# Patient Record
Sex: MALE | Race: WHITE | NOT HISPANIC OR LATINO | URBAN - METROPOLITAN AREA
[De-identification: names, ages, dates, MRNs, and addresses within clinical notes are randomized per-mention and may not be internally consistent; named-entity substitution may affect disease eponyms.]

---

## 2022-10-19 ENCOUNTER — OFFICE (OUTPATIENT)
Dept: URBAN - METROPOLITAN AREA CLINIC 79 | Facility: CLINIC | Age: 27
End: 2022-10-19
Payer: OTHER GOVERNMENT

## 2022-10-19 VITALS
WEIGHT: 181.4 LBS | TEMPERATURE: 96 F | HEIGHT: 72 IN | SYSTOLIC BLOOD PRESSURE: 139 MMHG | DIASTOLIC BLOOD PRESSURE: 80 MMHG | HEART RATE: 61 BPM

## 2022-10-19 DIAGNOSIS — K21.00 GASTRO-ESOPHAGEAL REFLUX DISEASE WITH ESOPHAGITIS, WITHOUT B: ICD-10-CM

## 2022-10-19 DIAGNOSIS — R13.10 DYSPHAGIA, UNSPECIFIED: ICD-10-CM

## 2022-10-19 PROCEDURE — 99204 OFFICE O/P NEW MOD 45 MIN: CPT | Performed by: INTERNAL MEDICINE

## 2022-10-19 NOTE — SERVICEHPINOTES
27yoM presenting with GERD for the past year and half. He has substernal burning sensation and cough.Marvel doesn't have dysphagia on a regular basis but he does have intermittent episodes of choking with a piece of meat.  Marvel also had self resolving episode of food impaction 6 years ago. He took PPI once daily (before bed) for the past year and every time he tries to wean off and switch to PRN anti-acids, would have recurrent symptoms. miguel Saw GI in [place] but never had an EGD since he had to move to [place] 6 months ago.miguel No history of asthma, eczema but he does have seasonal allergies managed with Flonase.
miguel rivera Denies weight loss, changes in bowel habits, N/V/abdominal or chest pain.  miguel rivera No tobacco or alcohol use.
miguel rivera No family hx of GI illness.
miguel Self 10 point review of systems have been reviewed as per HPI and otherwise negative. miguel rivera

## 2022-10-26 ENCOUNTER — OFFICE (OUTPATIENT)
Dept: URBAN - METROPOLITAN AREA PATHOLOGY 18 | Facility: PATHOLOGY | Age: 27
End: 2022-10-26
Payer: OTHER GOVERNMENT

## 2022-10-26 ENCOUNTER — OFFICE (OUTPATIENT)
Dept: URBAN - METROPOLITAN AREA CLINIC 30 | Facility: CLINIC | Age: 27
End: 2022-10-26
Payer: OTHER GOVERNMENT

## 2022-10-26 VITALS
DIASTOLIC BLOOD PRESSURE: 77 MMHG | WEIGHT: 181 LBS | SYSTOLIC BLOOD PRESSURE: 119 MMHG | SYSTOLIC BLOOD PRESSURE: 124 MMHG | OXYGEN SATURATION: 98 % | OXYGEN SATURATION: 100 % | OXYGEN SATURATION: 99 % | HEART RATE: 68 BPM | TEMPERATURE: 98.1 F | HEART RATE: 67 BPM | SYSTOLIC BLOOD PRESSURE: 121 MMHG | DIASTOLIC BLOOD PRESSURE: 70 MMHG | HEIGHT: 72 IN | RESPIRATION RATE: 14 BRPM | RESPIRATION RATE: 17 BRPM | DIASTOLIC BLOOD PRESSURE: 65 MMHG | RESPIRATION RATE: 11 BRPM | TEMPERATURE: 98.8 F | SYSTOLIC BLOOD PRESSURE: 132 MMHG | OXYGEN SATURATION: 96 % | DIASTOLIC BLOOD PRESSURE: 63 MMHG | HEART RATE: 77 BPM | HEART RATE: 74 BPM | SYSTOLIC BLOOD PRESSURE: 131 MMHG | DIASTOLIC BLOOD PRESSURE: 69 MMHG | RESPIRATION RATE: 16 BRPM

## 2022-10-26 DIAGNOSIS — K21.00 GASTRO-ESOPHAGEAL REFLUX DISEASE WITH ESOPHAGITIS, WITHOUT B: ICD-10-CM

## 2022-10-26 DIAGNOSIS — K20.0 EOSINOPHILIC ESOPHAGITIS: ICD-10-CM

## 2022-10-26 DIAGNOSIS — R13.10 DYSPHAGIA, UNSPECIFIED: ICD-10-CM

## 2022-10-26 PROCEDURE — 88305 TISSUE EXAM BY PATHOLOGIST: CPT | Performed by: PATHOLOGY

## 2022-10-26 PROCEDURE — 88312 SPECIAL STAINS GROUP 1: CPT | Performed by: PATHOLOGY

## 2022-10-26 RX ORDER — OMEPRAZOLE 40 MG/1
80 CAPSULE, DELAYED RELEASE ORAL
Qty: 120 | Refills: 3 | Status: ACTIVE

## 2022-11-15 PROBLEM — R13.10 DYSPHAGIA: Status: ACTIVE | Noted: 2022-10-26
